# Patient Record
Sex: FEMALE | Race: AMERICAN INDIAN OR ALASKA NATIVE | HISPANIC OR LATINO | URBAN - METROPOLITAN AREA
[De-identification: names, ages, dates, MRNs, and addresses within clinical notes are randomized per-mention and may not be internally consistent; named-entity substitution may affect disease eponyms.]

---

## 2018-08-14 ENCOUNTER — OFFICE VISIT (OUTPATIENT)
Dept: URBAN - METROPOLITAN AREA CLINIC 17 | Facility: CLINIC | Age: 63
End: 2018-08-14
Payer: MEDICARE

## 2018-08-14 PROCEDURE — 92134 CPTRZ OPH DX IMG PST SGM RTA: CPT | Performed by: OPHTHALMOLOGY

## 2018-08-14 PROCEDURE — 99213 OFFICE O/P EST LOW 20 MIN: CPT | Performed by: OPHTHALMOLOGY

## 2018-08-14 ASSESSMENT — INTRAOCULAR PRESSURE
OD: 9
OS: 10

## 2018-08-14 NOTE — IMPRESSION/PLAN
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 OU. Condition: stable. Vision: vision not affected. Plan: Discussed diagnosis in detail with patient. Exam shows minimal Diabetic changes. No treatment is recommended at this time. Emphasized blood sugar control and advised to keep future appointments with PCP and/or Endocrinologist for the management of Diabetes. Recommend observation for now.  OCT shows Stable no active edema OU

## 2018-08-14 NOTE — IMPRESSION/PLAN
Impression: Age-related nuclear cataract, bilateral: H25.13. OU. Condition: worsening. Vision: vision affected. Plan: Cataracts account for some decreased vision, would recommend patient be seen for a cataract eval in 6 months.

## 2019-03-14 ENCOUNTER — OFFICE VISIT (OUTPATIENT)
Dept: URBAN - METROPOLITAN AREA CLINIC 17 | Facility: CLINIC | Age: 64
End: 2019-03-14
Payer: MEDICARE

## 2019-03-14 DIAGNOSIS — E11.3593 TYPE 2 DIABETES MELLITUS W/ PROLIFERATIVE DIABETIC RETINOPATHY W/O MACULAR EDEMA, BILATERAL: Primary | ICD-10-CM

## 2019-03-14 DIAGNOSIS — H25.813 COMBINED FORMS OF AGE-RELATED CATARACT, BILATERAL: ICD-10-CM

## 2019-03-14 PROCEDURE — 92014 COMPRE OPH EXAM EST PT 1/>: CPT | Performed by: OPTOMETRIST

## 2019-03-14 PROCEDURE — 92134 CPTRZ OPH DX IMG PST SGM RTA: CPT | Performed by: OPTOMETRIST

## 2019-03-14 ASSESSMENT — INTRAOCULAR PRESSURE
OD: 9
OS: 9

## 2019-03-14 NOTE — IMPRESSION/PLAN
Impression: Type 2 diabetes mellitus w/ proliferative diabetic retinopathy w/o macular edema, bilateral: I96.7216. OU. Condition: stable. Vision: vision not affected. Plan: Discussed diagnosis in detail with patient. Exam shows minimal Diabetic changes. No treatment is recommended at this time. Emphasized blood sugar control and advised to keep future appointments with PCP and/or Endocrinologist for the management of Diabetes. Recommend observation for now.  OCT shows Stable no active edema OU

## 2019-09-12 ENCOUNTER — OFFICE VISIT (OUTPATIENT)
Dept: URBAN - METROPOLITAN AREA CLINIC 17 | Facility: CLINIC | Age: 64
End: 2019-09-12
Payer: MEDICARE

## 2019-09-12 DIAGNOSIS — H04.123 DRY EYE SYNDROME OF BILATERAL LACRIMAL GLANDS: ICD-10-CM

## 2019-09-12 PROCEDURE — 92014 COMPRE OPH EXAM EST PT 1/>: CPT | Performed by: OPTOMETRIST

## 2019-09-12 PROCEDURE — 92134 CPTRZ OPH DX IMG PST SGM RTA: CPT | Performed by: OPTOMETRIST

## 2019-09-12 ASSESSMENT — INTRAOCULAR PRESSURE
OD: 10
OS: 11

## 2019-09-12 NOTE — IMPRESSION/PLAN
Impression: Combined forms of age-related cataract, bilateral: H25.813. Plan: Discussed diagnosis with patient. Cataract evaluation is recommended. Will refer to Dr. Michael Mireles for CAT Eval. Pt wishes to proceed. 
Pt aware [facility] may not be much change due to DM

## 2020-03-05 ENCOUNTER — OFFICE VISIT (OUTPATIENT)
Dept: URBAN - METROPOLITAN AREA CLINIC 17 | Facility: CLINIC | Age: 65
End: 2020-03-05
Payer: MEDICARE

## 2020-03-05 PROCEDURE — 99214 OFFICE O/P EST MOD 30 MIN: CPT | Performed by: OPHTHALMOLOGY

## 2020-03-05 RX ORDER — PREDNISOLONE ACETATE 10 MG/ML
1 % SUSPENSION/ DROPS OPHTHALMIC
Qty: 10 | Refills: 1 | Status: INACTIVE
Start: 2020-03-05 | End: 2021-01-01

## 2020-03-05 RX ORDER — OFLOXACIN 3 MG/ML
0.3 % SOLUTION/ DROPS OPHTHALMIC
Qty: 5 | Refills: 1 | Status: INACTIVE
Start: 2020-03-05 | End: 2021-01-01

## 2020-03-05 RX ORDER — KETOROLAC TROMETHAMINE 5 MG/ML
0.5 % SOLUTION OPHTHALMIC
Qty: 1 | Refills: 1 | Status: INACTIVE
Start: 2020-03-05 | End: 2021-01-01

## 2020-03-05 ASSESSMENT — VISUAL ACUITY
OS: 20/150
OD: 20/80

## 2020-03-05 ASSESSMENT — INTRAOCULAR PRESSURE
OS: 10
OD: 11

## 2020-03-05 NOTE — IMPRESSION/PLAN
Impression: Combined forms of age-related cataract, bilateral: H25.813. Condition: established, worsening. Vision: vision affected. Plan: Cataract accounts for patient's complaints. Reviewed risks, benefits, and procedure. Patient desires surgery, schedule ce/iol OS then OD, RL2, standard lens, no kyra, distance refractive target, patient is clear for surgery in [facility]. Pt to use NSAID and Steroids (Or Combo drops) for 6-8 weeks after surgery.

## 2021-01-01 ENCOUNTER — POST-OPERATIVE VISIT (OUTPATIENT)
Dept: URBAN - METROPOLITAN AREA CLINIC 17 | Facility: CLINIC | Age: 66
End: 2021-01-01
Payer: MEDICARE

## 2021-01-01 ENCOUNTER — SURGERY (OUTPATIENT)
Dept: URBAN - METROPOLITAN AREA SURGERY 7 | Facility: SURGERY | Age: 66
End: 2021-01-01
Payer: MEDICARE

## 2021-01-01 DIAGNOSIS — H25.811 COMBINED FORMS OF AGE-RELATED CATARACT, RIGHT EYE: Primary | ICD-10-CM

## 2021-01-01 DIAGNOSIS — H25.13 AGE-RELATED NUCLEAR CATARACT, BILATERAL: Primary | ICD-10-CM

## 2021-01-01 DIAGNOSIS — Z96.1 PRESENCE OF INTRAOCULAR LENS: Primary | ICD-10-CM

## 2021-01-01 DIAGNOSIS — Z48.810 ENCOUNTER FOR SURGICAL AFTERCARE FOLLOWING SURGERY ON A SENSE ORGAN: Primary | ICD-10-CM

## 2021-01-01 PROCEDURE — 99024 POSTOP FOLLOW-UP VISIT: CPT | Performed by: OPTOMETRIST

## 2021-01-01 PROCEDURE — 99214 OFFICE O/P EST MOD 30 MIN: CPT | Performed by: OPHTHALMOLOGY

## 2021-01-01 PROCEDURE — 66984 XCAPSL CTRC RMVL W/O ECP: CPT | Performed by: OPHTHALMOLOGY

## 2021-01-01 RX ORDER — KETOROLAC TROMETHAMINE 5 MG/ML
0.5 % SOLUTION OPHTHALMIC
Qty: 5 | Refills: 1 | Status: ACTIVE
Start: 2021-01-01

## 2021-01-01 RX ORDER — PREDNISOLONE ACETATE 10 MG/ML
1 % SUSPENSION/ DROPS OPHTHALMIC
Qty: 10 | Refills: 1 | Status: ACTIVE
Start: 2021-01-01

## 2021-01-01 RX ORDER — OFLOXACIN 3 MG/ML
0.3 % SOLUTION/ DROPS OPHTHALMIC
Qty: 5 | Refills: 1 | Status: ACTIVE
Start: 2021-01-01

## 2021-01-01 ASSESSMENT — INTRAOCULAR PRESSURE
OD: 9
OS: 10
OS: 11
OS: 17
OD: 14
OD: 9
OS: 9
OD: 11
OS: 12
OS: 16
OD: 14
OD: 9

## 2021-01-01 ASSESSMENT — VISUAL ACUITY
OD: 20/80
OS: 20/80
OS: 20/50
OD: 40

## 2021-01-01 ASSESSMENT — PACHYMETRY
OS: 24.32
OS: 2.83
OD: 2.58
OD: 24.23

## 2021-02-12 NOTE — IMPRESSION/PLAN
Impression: Combined forms of age-related cataract, bilateral: H25.813. Condition: established, worsening. Vision: vision affected. Plan: Cataract accounts for patient's complaints. Reviewed risks, benefits, and procedure. Patient desires surgery, schedule ce/iol OS then OD, RL2, standard lens, distance refractive target, patient is clear for surgery in [place] [address]. Pt to use NSAID and Steroids (Or Combo drops) for 6-8 weeks after surgery. Advised pt visual improvement may be limited due to retinopathy. Pt aware and would like to proceed.

## 2021-02-24 NOTE — IMPRESSION/PLAN
Impression: S/P Cataract Extraction by phacoemulsification with IOL placement [id number] OS - 1 Day. Encounter for surgical aftercare following surgery on a sense organ  Z48.810.  Plan: 1 WK PO2 Continue Ocuflox QID 1 WK then D/C
--Taper Prednisolone acetate 1% QID x 2 wk, TID x 1 wk, BID x 1wk, QD x 1wk, then d/c
Ketorolac QID 6-8 wks Statement Selected

## 2021-03-02 NOTE — IMPRESSION/PLAN
Impression: S/P CE/Standard IOL placement [id number] OS - 7 Days. Encounter for surgical aftercare following surgery on a sense organ  Z48.810.  Plan: 2nd eye orders --Finish Ocuflox--Taper Prednisolone acetate 1% QID x 1 wk, TID x 1 wk, BID x 1wk, QD x 1wk, then d/c
Ketorolac QID 6-8 wks

## 2021-03-19 NOTE — IMPRESSION/PLAN
Impression: S/P Cataract Extraction by phacoemulsification with IOL placement [id number] OD - 1 Day. Presence of intraocular lens  Z96.1. Plan: Keep appt. on 03/25/2021 with Dr. Christina Lamas for PO2 --Continue Ofloxacin 0.3% QID OD x 7 days then d/c 
--Taper Prednisolone acetate 1% QID x 1 wk, TID x 1 wk, BID x 1wk, QD x 1wk, then d/c--Advised patient to use artificial tears for comfort.

## 2021-03-25 NOTE — IMPRESSION/PLAN
Impression: S/P Cataract Extraction by phacoemulsification with IOL placement [id number] OD - 7 Days. Encounter for surgical aftercare following surgery on a sense organ  Z48.810. Plan: RTC in 3wks for PO3 --Taper Prednisolone acetate 1% QID x 1 wk, TID x 1 wk, BID x 1wk, QD x 1wk, then d/c--Discontinue Ofloxacin 0.3%--Advised patient to use artificial tears for comfort.

## 2021-04-15 NOTE — IMPRESSION/PLAN
Impression: S/P Cataract Extraction by phacoemulsification with IOL placement [id number] OD - 28 Days. Presence of intraocular lens  Z96.1. Plan: 3 wks PO3 --Taper Prednisolone acetate 1% QID x 1wk, TID x 1wk, BID x 1wk, QD x 1wk--Advised patient to use artificial tears for comfort.